# Patient Record
Sex: MALE | Race: WHITE | NOT HISPANIC OR LATINO | Employment: FULL TIME | URBAN - METROPOLITAN AREA
[De-identification: names, ages, dates, MRNs, and addresses within clinical notes are randomized per-mention and may not be internally consistent; named-entity substitution may affect disease eponyms.]

---

## 2023-02-15 PROBLEM — J45.990 EXERCISE-INDUCED ASTHMA (HHS-HCC): Status: ACTIVE | Noted: 2023-02-15

## 2023-02-15 PROBLEM — F32.A ANXIETY AND DEPRESSION: Status: ACTIVE | Noted: 2023-02-15

## 2023-02-15 PROBLEM — F17.200 TOBACCO USE DISORDER: Status: ACTIVE | Noted: 2023-02-15

## 2023-02-15 PROBLEM — F41.9 ANXIETY AND DEPRESSION: Status: ACTIVE | Noted: 2023-02-15

## 2023-02-15 PROBLEM — R00.2 HEART PALPITATIONS: Status: ACTIVE | Noted: 2023-02-15

## 2023-02-15 PROBLEM — G44.309 POST-CONCUSSION HEADACHE: Status: ACTIVE | Noted: 2023-02-15

## 2023-02-15 PROBLEM — R07.89 ATYPICAL CHEST PAIN: Status: ACTIVE | Noted: 2023-02-15

## 2023-02-15 PROBLEM — R05.3 COUGH, PERSISTENT: Status: ACTIVE | Noted: 2023-02-15

## 2023-02-15 PROBLEM — F64.9 GENDER DYSPHORIA: Status: ACTIVE | Noted: 2023-02-15

## 2023-02-15 PROBLEM — S01.91XA LACERATION OF HEAD: Status: ACTIVE | Noted: 2023-02-15

## 2023-02-15 PROBLEM — J20.9 ACUTE BRONCHITIS: Status: ACTIVE | Noted: 2023-02-15

## 2023-02-15 PROBLEM — R55 SYNCOPE AND COLLAPSE: Status: ACTIVE | Noted: 2023-02-15

## 2023-02-15 PROBLEM — Z86.16 HISTORY OF COVID-19: Status: ACTIVE | Noted: 2023-02-15

## 2023-02-15 PROBLEM — J22 LOWER RESPIRATORY INFECTION: Status: ACTIVE | Noted: 2023-02-15

## 2023-02-15 PROBLEM — R05.8 ALLERGIC COUGH: Status: ACTIVE | Noted: 2023-02-15

## 2023-02-15 PROBLEM — N93.9 ABNORMAL UTERINE BLEEDING: Status: ACTIVE | Noted: 2023-02-15

## 2023-02-15 PROBLEM — S06.0X1A CONCUSSION WTH LOSS OF CONSCIOUSNESS OF 30 MINUTES OR LESS: Status: ACTIVE | Noted: 2023-02-15

## 2023-02-15 PROBLEM — Q21.12 PATENT FORAMEN OVALE (HHS-HCC): Status: ACTIVE | Noted: 2023-02-15

## 2023-02-15 PROBLEM — Z86.59 HISTORY OF ADHD: Status: ACTIVE | Noted: 2023-02-15

## 2023-02-15 PROBLEM — F64.0 GENDER IDENTITY DISORDER OF ADOLESCENCE: Status: ACTIVE | Noted: 2023-02-15

## 2023-02-15 RX ORDER — BUSPIRONE HYDROCHLORIDE 10 MG/1
1 TABLET ORAL 3 TIMES DAILY
COMMUNITY

## 2023-02-15 RX ORDER — ESCITALOPRAM OXALATE 20 MG/1
1 TABLET ORAL DAILY
COMMUNITY
Start: 2021-12-09 | End: 2024-05-07 | Stop reason: SDUPTHER

## 2023-02-15 RX ORDER — CLOPIDOGREL BISULFATE 75 MG/1
1 TABLET ORAL DAILY
COMMUNITY
Start: 2022-09-13

## 2023-04-07 ENCOUNTER — APPOINTMENT (OUTPATIENT)
Dept: PRIMARY CARE | Facility: CLINIC | Age: 28
End: 2023-04-07
Payer: COMMERCIAL

## 2023-04-07 PROBLEM — R51.9 HEADACHE: Status: ACTIVE | Noted: 2023-04-07

## 2023-04-07 PROBLEM — E16.2 HYPOGLYCEMIA: Status: ACTIVE | Noted: 2023-04-07

## 2023-04-07 PROBLEM — K21.9 GERD (GASTROESOPHAGEAL REFLUX DISEASE): Status: ACTIVE | Noted: 2023-04-07

## 2023-04-07 RX ORDER — OMEPRAZOLE 20 MG/1
20 CAPSULE, DELAYED RELEASE ORAL DAILY
COMMUNITY

## 2023-04-07 RX ORDER — ISOPROPYL ALCOHOL 70 ML/100ML
SWAB TOPICAL
COMMUNITY

## 2023-04-07 RX ORDER — TESTOSTERONE 50 MG/5G
50 GEL TRANSDERMAL DAILY
COMMUNITY

## 2023-04-07 RX ORDER — SYRINGE W-NEEDLE,DISPOSAB,3 ML 25GX5/8"
SYRINGE, EMPTY DISPOSABLE MISCELLANEOUS
COMMUNITY

## 2023-04-07 RX ORDER — BLOOD SUGAR DIAGNOSTIC
STRIP MISCELLANEOUS DAILY
COMMUNITY

## 2023-04-07 RX ORDER — NORETHINDRONE 5 MG/1
5 TABLET ORAL DAILY
COMMUNITY

## 2023-05-17 ENCOUNTER — PATIENT OUTREACH (OUTPATIENT)
Dept: CARE COORDINATION | Facility: CLINIC | Age: 28
End: 2023-05-17
Payer: COMMERCIAL

## 2024-05-02 ENCOUNTER — TELEPHONE (OUTPATIENT)
Dept: PRIMARY CARE | Facility: CLINIC | Age: 29
End: 2024-05-02
Payer: COMMERCIAL

## 2024-05-02 NOTE — TELEPHONE ENCOUNTER
Pt called requesting refill of escitalopram. Noted pt not seen in more than 1.5 years. Attempt to schedule pt for FUV unsuccessful. No answer, voicemail left to return nurse call. Pt chart sent to scheduling pool in attempt to assist pt in scheduling to establish care.

## 2024-08-09 DIAGNOSIS — F41.9 ANXIETY AND DEPRESSION: ICD-10-CM

## 2024-08-09 DIAGNOSIS — F32.A ANXIETY AND DEPRESSION: ICD-10-CM

## 2024-08-12 RX ORDER — ESCITALOPRAM OXALATE 20 MG/1
20 TABLET ORAL DAILY
Qty: 30 TABLET | Refills: 0 | Status: SHIPPED | OUTPATIENT
Start: 2024-08-12

## 2024-08-21 ENCOUNTER — TELEPHONE (OUTPATIENT)
Dept: UROLOGY | Facility: CLINIC | Age: 29
End: 2024-08-21
Payer: COMMERCIAL

## 2024-08-21 DIAGNOSIS — F64.9 GENDER DYSPHORIA: ICD-10-CM

## 2024-08-22 ENCOUNTER — APPOINTMENT (OUTPATIENT)
Dept: UROLOGY | Facility: HOSPITAL | Age: 29
End: 2024-08-22
Payer: COMMERCIAL

## 2024-09-11 ENCOUNTER — OFFICE VISIT (OUTPATIENT)
Dept: PRIMARY CARE | Facility: CLINIC | Age: 29
End: 2024-09-11
Payer: COMMERCIAL

## 2024-09-11 VITALS
WEIGHT: 147 LBS | HEIGHT: 64 IN | OXYGEN SATURATION: 98 % | DIASTOLIC BLOOD PRESSURE: 75 MMHG | SYSTOLIC BLOOD PRESSURE: 111 MMHG | BODY MASS INDEX: 25.1 KG/M2 | HEART RATE: 62 BPM | TEMPERATURE: 97.2 F | RESPIRATION RATE: 14 BRPM

## 2024-09-11 DIAGNOSIS — R21 RASH: ICD-10-CM

## 2024-09-11 DIAGNOSIS — F64.9 GENDER DYSPHORIA: ICD-10-CM

## 2024-09-11 DIAGNOSIS — F41.9 ANXIETY AND DEPRESSION: ICD-10-CM

## 2024-09-11 DIAGNOSIS — F32.A ANXIETY AND DEPRESSION: ICD-10-CM

## 2024-09-11 DIAGNOSIS — Z86.73 HISTORY OF STROKE: ICD-10-CM

## 2024-09-11 DIAGNOSIS — R19.7 DIARRHEA, UNSPECIFIED TYPE: Primary | ICD-10-CM

## 2024-09-11 LAB
ALBUMIN SERPL BCP-MCNC: 4.7 G/DL (ref 3.4–5)
ALP SERPL-CCNC: 34 U/L (ref 33–120)
ALT SERPL W P-5'-P-CCNC: 25 U/L (ref 7–52)
ANION GAP SERPL CALC-SCNC: 13 MMOL/L (ref 10–20)
AST SERPL W P-5'-P-CCNC: 23 U/L (ref 9–39)
BASOPHILS # BLD AUTO: 0.02 X10*3/UL (ref 0–0.1)
BASOPHILS NFR BLD AUTO: 0.5 %
BILIRUB SERPL-MCNC: 0.5 MG/DL (ref 0–1.2)
BUN SERPL-MCNC: 11 MG/DL (ref 6–23)
CALCIUM SERPL-MCNC: 9.6 MG/DL (ref 8.6–10.6)
CHLORIDE SERPL-SCNC: 107 MMOL/L (ref 98–107)
CHOLEST SERPL-MCNC: 193 MG/DL (ref 0–199)
CHOLESTEROL/HDL RATIO: 2.9
CO2 SERPL-SCNC: 25 MMOL/L (ref 21–32)
CREAT SERPL-MCNC: 0.91 MG/DL (ref 0.5–1.3)
EGFRCR SERPLBLD CKD-EPI 2021: 89 ML/MIN/1.73M*2
EOSINOPHIL # BLD AUTO: 0.06 X10*3/UL (ref 0–0.7)
EOSINOPHIL NFR BLD AUTO: 1.5 %
ERYTHROCYTE [DISTWIDTH] IN BLOOD BY AUTOMATED COUNT: 11.8 % (ref 11.5–14.5)
GLIADIN PEPTIDE IGA SER IA-ACNC: <1 U/ML
GLUCOSE SERPL-MCNC: 85 MG/DL (ref 74–99)
HCT VFR BLD AUTO: 40.1 % (ref 36–52)
HDLC SERPL-MCNC: 67 MG/DL
HGB BLD-MCNC: 13.5 G/DL (ref 12–17.5)
IMM GRANULOCYTES # BLD AUTO: 0.02 X10*3/UL (ref 0–0.7)
IMM GRANULOCYTES NFR BLD AUTO: 0.5 % (ref 0–0.9)
LDLC SERPL CALC-MCNC: 114 MG/DL
LYMPHOCYTES # BLD AUTO: 2.09 X10*3/UL (ref 1.2–4.8)
LYMPHOCYTES NFR BLD AUTO: 51.4 %
MCH RBC QN AUTO: 29.9 PG (ref 26–34)
MCHC RBC AUTO-ENTMCNC: 33.7 G/DL (ref 32–36)
MCV RBC AUTO: 89 FL (ref 80–100)
MONOCYTES # BLD AUTO: 0.3 X10*3/UL (ref 0.1–1)
MONOCYTES NFR BLD AUTO: 7.4 %
NEUTROPHILS # BLD AUTO: 1.58 X10*3/UL (ref 1.2–7.7)
NEUTROPHILS NFR BLD AUTO: 38.7 %
NON HDL CHOLESTEROL: 126 MG/DL (ref 0–149)
NRBC BLD-RTO: 0 /100 WBCS (ref 0–0)
PLATELET # BLD AUTO: 290 X10*3/UL (ref 150–450)
POTASSIUM SERPL-SCNC: 4.2 MMOL/L (ref 3.5–5.3)
PROT SERPL-MCNC: 7.1 G/DL (ref 6.4–8.2)
RBC # BLD AUTO: 4.51 X10*6/UL (ref 4–5.9)
SODIUM SERPL-SCNC: 141 MMOL/L (ref 136–145)
TESTOST SERPL-MCNC: <30 NG/DL (ref 0–1000)
TRIGL SERPL-MCNC: 60 MG/DL (ref 0–149)
TTG IGA SER IA-ACNC: <1 U/ML
VLDL: 12 MG/DL (ref 0–40)
WBC # BLD AUTO: 4.1 X10*3/UL (ref 4.4–11.3)

## 2024-09-11 PROCEDURE — 3008F BODY MASS INDEX DOCD: CPT | Performed by: FAMILY MEDICINE

## 2024-09-11 PROCEDURE — 85025 COMPLETE CBC W/AUTO DIFF WBC: CPT | Performed by: FAMILY MEDICINE

## 2024-09-11 PROCEDURE — 80053 COMPREHEN METABOLIC PANEL: CPT | Performed by: FAMILY MEDICINE

## 2024-09-11 PROCEDURE — 80061 LIPID PANEL: CPT | Performed by: FAMILY MEDICINE

## 2024-09-11 PROCEDURE — 99214 OFFICE O/P EST MOD 30 MIN: CPT | Performed by: FAMILY MEDICINE

## 2024-09-11 PROCEDURE — 83516 IMMUNOASSAY NONANTIBODY: CPT | Performed by: FAMILY MEDICINE

## 2024-09-11 PROCEDURE — 84403 ASSAY OF TOTAL TESTOSTERONE: CPT | Performed by: FAMILY MEDICINE

## 2024-09-11 PROCEDURE — 1036F TOBACCO NON-USER: CPT | Performed by: FAMILY MEDICINE

## 2024-09-11 PROCEDURE — 36415 COLL VENOUS BLD VENIPUNCTURE: CPT | Performed by: FAMILY MEDICINE

## 2024-09-11 RX ORDER — ASPIRIN 81 MG/1
81 TABLET ORAL DAILY
Qty: 30 TABLET | Refills: 5 | Status: SHIPPED | OUTPATIENT
Start: 2024-09-11 | End: 2025-09-11

## 2024-09-11 RX ORDER — TRIAMCINOLONE ACETONIDE 1 MG/G
OINTMENT TOPICAL 2 TIMES DAILY PRN
Qty: 60 G | Refills: 2 | Status: SHIPPED | OUTPATIENT
Start: 2024-09-11 | End: 2025-01-09

## 2024-09-11 RX ORDER — ESCITALOPRAM OXALATE 20 MG/1
20 TABLET ORAL DAILY
Qty: 30 TABLET | Refills: 5 | Status: SHIPPED | OUTPATIENT
Start: 2024-09-11

## 2024-09-11 ASSESSMENT — ENCOUNTER SYMPTOMS
HEADACHES: 0
APPETITE CHANGE: 1
BRUISES/BLEEDS EASILY: 0
PALPITATIONS: 0
ARTHRALGIAS: 1
LIGHT-HEADEDNESS: 0
SHORTNESS OF BREATH: 0
JOINT SWELLING: 0
COUGH: 0
SORE THROAT: 0
FEVER: 0
DIZZINESS: 0
CHILLS: 0

## 2024-09-11 ASSESSMENT — PAIN SCALES - GENERAL: PAINLEVEL: 0-NO PAIN

## 2024-09-11 NOTE — PROGRESS NOTES
"  Subjective   Patient ID: Roman Way is a 28 y.o. transgender male who presents for Annual Exam.    HPI    He reports a 6 month history of gas and bloating after eating. He also reports 2 episodes per day of loose, watery, green diarrhea that smells very bad. His symptoms occur after every meal regardless of what he ate. His wife does not have any GI symptoms, but his step son has diarrhea as well although he describes it as \"different\". He has acid reflux when he drinks pop so he avoids it.  He has never had anything like this in the past. He has a family history of lactose intolerance, celiac disease, IBS, and Crohn's.  Of note, he did eat primarily gluten free a few years ago, but has been eating gluten since then. He stopped vaping 1 year ago and stopped using nicotine pouches 6 months ago.    He did have a stroke approximately 2 years ago and subsequently had his PFO surgically repaired. He notes that he is still forgetful while he is talking, but denies any other symptoms including headaches.    He is currently working with urology to scheduled a hysterectomy. He was previously taking testosterone but stopped after his stroke. He is working on getting 2 letters in support of his gender affirming surgery.    His depression is very well controlled with Lexapro, but he describes his anxiety as \"horrible\" and constant. His anxiety has been much worse since he stopped vaping. Prozac and Buspar did not work well for him. He denies thoughts of harming himself. He has an appointment with a psychologist tomorrow for a letter for his gender affirming surgery.    He reports \"sweat pimples\" on his legs that are itchy. He has had similar rashes throughout his life but thinks it is getting worse. The rash will go away when he is not working.    PMH  Stroke: 2 years ago  PFO: surgically closed 2 years ago  Anxiety  Depression    Medications  Lexapro: currently taking 10 mg daily  Not currently taking aspirin " "since he has been forgetting to take it    Surgical History  PFO correction: November 2022    Social history  Recently got  (wife's name is Darby). They are trying to have a baby. They are using a sperm donor and are waiting to see if Darby is pregnant.  He is no longer interested in saving his eggs as it is too expensive.  He has a 4 year old step son. He works at [employer]. His wife works at a .    Family history  Celiac disease: majority of dad's side of family  Crohn's disease: Cousin on dad's side  IBS: Mother and brother  No know history of colon cancer      Review of Systems   Constitutional:  Positive for appetite change. Negative for chills and fever.   HENT:  Negative for congestion and sore throat.    Respiratory:  Negative for cough and shortness of breath.    Cardiovascular:  Negative for chest pain and palpitations.   Genitourinary: Negative.    Musculoskeletal:  Positive for arthralgias. Negative for joint swelling.   Skin:  Positive for rash.   Neurological:  Negative for dizziness, light-headedness and headaches.   Hematological:  Does not bruise/bleed easily.       Objective     /75   Pulse 62   Temp 36.2 °C (97.2 °F)   Resp 14   Ht 1.626 m (5' 4\")   Wt 66.7 kg (147 lb)   SpO2 98%   BMI 25.23 kg/m²   General: well appearing, no distress  Neck: No lymphadenopathy, no thyromegaly  CV: Regular rate and rhythm, no murmur  Lungs: Clear to auscultation bilaterally  Abdomen: Soft, nondistended, no rebound or guarding, slight epigastric tenderness,   Extremities: No edema noted, bilateral diffuse maculopapular rash throughout entire lower extremities, L>R  Psych: Appropriate mood and affect       Assessment/Plan     Roman Way is a 28 y.o. transgender male who presents for diarrhea, rash, anxiety, and gender affirming care.    #Anxiety and Depression  -Currently taking Lexapro 10mg, will increase to 20mg today. If symptoms worsen, can go back to 10mg.  -Placed referral " to Jake [facility]ne and provided a list of other therapists in the area    #Diarrhea  -Differential includes lactose intolerance, celiac disease, and IBS. Less likely Crohn's or fat malabsorption.  -Will draw celiac panel, CBC, CMP, and lipid panel today  -Plan for elimination diet: will start using lactaid for at least 2 weeks. If symptoms do not improve, will cut out gluten for at least 2 weeks.  -Plan to follow-up in 1 month and can consider fecal calprotectin and/or gallbladder ultrasound if indicated    #Gender affirming care  -Follow-up with Dr. Oconnor concerning hysterectomy  - Patient would like to resume testosterone- previously took IM testosterone cypionate. Was taking 75 mg weekly, although levels very supratherapeutic. Decreased to 50 mg prior to stopping. We previously discussed resuming with his neurologist and he was approved to resume from a stroke standpoint, however he was then looking into the process of fertility preservation. He no longer wants to pursue fertility preservation.   - We discussed effects and risks of testosterone, of which he was already aware from prior therapy. Pending labs will likely resume 50 mg weekly    #History of stroke  -Plan to restart baby aspirin    #Rash  -Prescribed Kenalog for use on rash as needed  -Plan to follow-up in 1 month to assess resolution of rash with cream  -If needed, can refer to dermatology    Will address health maintenance at next visit       Plan to follow-up in one month to restart testosterone, and follow-up on GI symptoms and rash.    Shama Sebastian, MS-3     Patient was seen and examined by myself in conjunction with medical student. I have edited note above. Dante Lawson

## 2024-09-12 ENCOUNTER — TELEMEDICINE (OUTPATIENT)
Dept: UROLOGY | Facility: HOSPITAL | Age: 29
End: 2024-09-12
Payer: COMMERCIAL

## 2024-09-12 DIAGNOSIS — F64.9 GENDER DYSPHORIA: Primary | ICD-10-CM

## 2024-09-12 PROCEDURE — 90791 PSYCH DIAGNOSTIC EVALUATION: CPT | Performed by: PSYCHOLOGIST

## 2024-09-14 LAB
GLIADIN PEPTIDE IGG SER IA-ACNC: <0.56 FLU (ref 0–4.99)
TTG IGG SER IA-ACNC: <0.82 FLU (ref 0–4.99)

## 2024-09-17 DIAGNOSIS — F64.9 GENDER DYSPHORIA: Primary | ICD-10-CM

## 2024-09-17 RX ORDER — SYRINGE W-NEEDLE,DISPOSAB,3 ML 25GX5/8"
SYRINGE, EMPTY DISPOSABLE MISCELLANEOUS
Qty: 12 EACH | Refills: 3 | Status: SHIPPED | OUTPATIENT
Start: 2024-09-17

## 2024-09-17 RX ORDER — SYRINGE WITH NEEDLE, 1 ML 25GX5/8"
SYRINGE, EMPTY DISPOSABLE MISCELLANEOUS
Qty: 12 EACH | Refills: 3 | Status: SHIPPED | OUTPATIENT
Start: 2024-09-17

## 2024-09-17 RX ORDER — ISOPROPYL ALCOHOL 70 ML/100ML
1 SWAB TOPICAL
Qty: 100 EACH | Refills: 3 | Status: SHIPPED | OUTPATIENT
Start: 2024-09-22

## 2024-09-17 RX ORDER — TESTOSTERONE CYPIONATE 200 MG/ML
50 INJECTION, SOLUTION INTRAMUSCULAR
Qty: 4 ML | Refills: 1 | Status: SHIPPED | OUTPATIENT
Start: 2024-09-17

## 2024-09-19 NOTE — PROGRESS NOTES
Outpatient Psychology Initial Appointment  Subjective   Roman Way, a 28 y.o. adult, for initial evaluation visit. Participated in diagnostic interview and identification of goals for treatment.      Patient is referred by  [facility].      Reason:  He has been experiencing ongoing gender dysphoria.      Psychosocial History Roman is seeking psychological services in an effort to gain a letter of support for a hysterectomy.  Roman reports that he has undergone gender affirmative care for the past 10 years.  He grew up a middle child with two brothers.  According to Roman, he always wanted to do what they did and felt drawn to more masculine things.  He initially came out as a Lesbian for a few years, but determined by high school that sexual identity did not completely capture what he was feeling.  He then started to read on gender and gender identity to help understand himself.  Out of high school, Roman joined the , but was discharged due to mental health and gender dysphoria.  He states that parents initially had some difficulty accepting this about him.  He began gender affirming hormone therapy on April 20, 2017 with testosterone.  In March of 2018 he also had top surgery.  Roman states that while these were all helpful steps, he still has some dysphoria with bottom, and would like the benefit of no longer having any menstruation. While he is undecided currently, he also states that in the event he would want to do bottom surgery/phalloplasty this step would already be complete.  Roman states that he has been off of testosterone for approximately a year due to having had a stroke as a result of a hole in his heart.  This stroke was reportedly in his temporal lobe at at times he forgets what he is saying.      Roman grew up in the [place], [place] area.  He currently works as an [employer] , and has for the past year.  Prior to this he was with [employer] for 3-4 years doing the  same thing.  He stated that he left there after having an incident where he passed out on the pole.  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  They have reportedly been together for 2 years, and  for 2 months. She is reportedly a big support.      Mental Status Evaluation:  Appearance:  age appropriate   Behavior:  normal   Speech:  normal pitch   Mood:  normal   Affect:  normal   Thought Process:  normal   Thought Content:  normal   Sensorium:  person, place, time/date, situation, day of week, month of year, and year   Cognition:  grossly intact   Insight:  Intact, as evidenced by ability to express internal thoughts, feelings, insights.          Assessment/Plan     Diagnosis:   Patient Active Problem List   Diagnosis    Abnormal uterine bleeding    Acute bronchitis    Allergic cough    Atypical chest pain    Concussion wth loss of consciousness of 30 minutes or less    Anxiety and depression    Cough, persistent    Exercise-induced asthma (HHS-HCC)    Gender identity disorder of adolescence    Gender dysphoria    History of ADHD    History of COVID-19    Laceration of head    Lower respiratory infection    Patent foramen ovale (HHS-HCC)    Post-concussion headache    Syncope and collapse    Tobacco use disorder    Heart palpitations    GERD (gastroesophageal reflux disease)    Headache    Hypoglycemia       Treatment Goals:  Specify outcomes written in observable, behavioral terms:   Alleviate Gender Dysphoria    Treatment Plan/Recommendations: Letter of support for transgender affirmative care to follow.      Provider is available for ongoing care if needed.       Review with patient: Treatment plan reviewed with the patient.    Andrew Michael PsyD

## 2024-10-02 NOTE — PROGRESS NOTES
FOLLOW-UP VISIT       HISTORY OF PRESENT ILLNESS:   Roman Way is a 28 y.o. adult who presents to me today for follow-up to further discuss scheduling for a hysterectomy. The patient previously had a CVA and has completed treatment with Plavix. He is no longer pursuing oocyte preservation as he is not interested in having children.     He has resumed testosterone.    PAST MEDICAL HISTORY:  Past Medical History:   Diagnosis Date    Cerebral infarction, unspecified (Multi) 08/25/2022    Cryptogenic stroke    Exercise induced bronchospasm (HHS-HCC) 05/20/2020    Mild exercise-induced asthma    Personal history of other mental and behavioral disorders 05/20/2020    History of attention deficit hyperactivity disorder (ADHD)       PAST SURGICAL HISTORY:  Past Surgical History:   Procedure Laterality Date    MR HEAD ANGIO WO IV CONTRAST  1/27/2023    MR HEAD ANGIO WO IV CONTRAST 1/27/2023 [facility]GAC[facility]    MR HEAD ANGIO WO IV CONTRAST  8/14/2022    MR HEAD ANGIO WO IV CONTRAST 8/14/2022 [facility] CLINICAL LEGAC[facility]    MR HEAD ANGIO WO IV CONTRAST  5/15/2023    MR HEAD ANGIO WO IV CONTRAST 5/15/2023 [facility]    MR NECK ANGIO WO IV CONTRAST  1/27/2023    MR NECK ANGIO WO IV CONTRAST 1/27/2023 [facility] OFF[facility] LEGAC[facility]    MR NECK ANGIO WO IV CONTRAST  8/14/2022    MR NECK ANGIO WO IV CONTRAST 8/14/2022 [facility] CLINICAL LEGACY    MR NECK ANGIO WO IV CONTRAST  5/15/2023    MR NECK ANGIO WO IV CONTRAST 5/15/2023 [facility]    OTHER SURGICAL HISTORY  02/21/2019    Mastectomy bilateral    OTHER SURGICAL HISTORY  05/20/2020    Bosler tooth extraction        ALLERGIES:   No Known Allergies     MEDICATIONS:   Medication Documentation Review Audit       Reviewed by Rhianna Simental CMA (Medical Assistant) on 09/11/24 at 1043      Medication Order Taking? Sig Documenting Provider Last Dose Status   alcohol swabs (Alcohol Prep Pads) pads, medicated [id number] No Apply topically. Historical Provider, MD Not Taking Active   aspirin-calcium  "carbonate 81 mg-300 mg calcium(777 mg) tablet [id number] No Take 1 tablet by mouth in the morning. Keron Walter MD Not Taking Active   busPIRone (Buspar) 10 mg tablet [id number] No Take 1 tablet (10 mg) by mouth 3 times a day. Keron Walter MD Not Taking Active   clopidogrel (Plavix) 75 mg tablet [id number] No Take 1 tablet (75 mg) by mouth once daily. Keron Walter MD Not Taking Active   escitalopram (Lexapro) 20 mg tablet [id number] Yes Take 1 tablet (20 mg) by mouth once daily. Norma Lawson MD Taking Active   FREESTYLE LANCETS MISC [id number] No  Keron Walter MD Not Taking Active   FreeStyle Test strip [id number] No once daily. Keron Walter MD Not Taking Flag for Review   norethindrone (Aygestin) 5 mg tablet [id number] No Take 1 tablet (5 mg) by mouth once daily. Keron Walter MD Not Taking Active   omeprazole (PriLOSEC) 20 mg DR capsule [id number] No Take 1 capsule (20 mg) by mouth once daily. Do not crush or chew. Keron Walter MD Not Taking Active   syringe with needle 3 mL 22 x 1 1/2\" syringe [id number] No Inject testosterone once weekly as instructed by MD Keron Walter MD Not Taking Active   testosterone (Androgel) 50 mg/5 gram (1 %) gel [id number] No Place 1 packet (50 mg) on the skin once daily. To calf Keron Walter MD Not Taking Active                     SOCIAL HISTORY:  Patient  reports that he has never smoked. He has never used smokeless tobacco. He reports that he does not drink alcohol and does not use drugs.   Social History     Socioeconomic History    Marital status: Single     Spouse name: Not on file    Number of children: Not on file    Years of education: Not on file    Highest education level: Not on file   Occupational History    Not on file   Tobacco Use    Smoking status: Never    Smokeless tobacco: Never   Substance and Sexual Activity    Alcohol use: Never    Drug use: Never    Sexual activity: Not on file   Other Topics Concern    " Not on file   Social History Narrative    Not on file     Social Determinants of Health     Financial Resource Strain: Not on file   Food Insecurity: Not on file   Transportation Needs: Not on file   Physical Activity: Not on file   Stress: Not on file   Social Connections: Not on file   Intimate Partner Violence: Not on file   Housing Stability: Not on file       FAMILY HISTORY:  Family History   Problem Relation Name Age of Onset    Fibromyalgia Mother      Depression Mother      Thyroid disease Mother      Thyroid cancer Mother      Hypertension Father      Alcohol abuse Father         REVIEW OF SYSTEMS:  Constitutional: Negative for fever and chills. Denies anorexia, weight loss.  Eyes: Negative for visual disturbance.   Respiratory: Negative for shortness of breath.    Cardiovascular: Negative for chest pain.   Gastrointestinal: Negative for nausea and vomiting.   Genitourinary: See interval history above.  Skin: Negative for rash.   Neurological: Negative for dizziness and numbness.   Psychiatric/Behavioral: Negative for confusion and decreased concentration.     PHYSICAL EXAM:  There were no vitals taken for this visit.  Virtual appointment, patient appears well.      Assessment:      1. History of stroke in adulthood  Referral to Vascular Medicine          Roman Way is a 28 y.o. adult presenting to discuss gender affirming hysterectomy.     We discussed lack of research guidance for comparison of long-term outcomes of ovarian conservation with testosterone treatment compared to ovarian removal.   The patient would prefer: laparoscopic total hysterectomy, BSO, and cystoscopy    We discussed the low risk of vaginal cuff cancer with cervix removal, but still a risk, and while we would not be able to access the vaginal cuff after a vaginectomy for the purpose of cytology/pap smear, he could consider completing vaccination for HPV.      We discussed high likelihood of laparoscopic procedure, but  possible vaginal hysterectomy in the case of adequate descensus.     Due to the patient's previous history of stroke and PFO, we will have him get surgical clearance from a member of the vascular medicine team. I will put in a referral to Dr. Amber Huggins at .      Plan:   Order pelvic ultrasound.   Referral to Dr. Amber Huggins in vascular medicine for pre-surgical clearance.  Patient will be scheduled for a laparoscopic total hysterectomy, bilateral salpingo-oophorectomy, and cystoscopy at their earliest convenience.       Disha Oconnor MD MPH      Scribe Attestation  By signing my name below, I, Dariel Crespo   attest that this documentation has been prepared under the direction and in the presence of Disha Oconnor MD MPH.

## 2024-10-03 ENCOUNTER — APPOINTMENT (OUTPATIENT)
Dept: UROLOGY | Facility: CLINIC | Age: 29
End: 2024-10-03
Payer: COMMERCIAL

## 2024-10-04 ENCOUNTER — OFFICE VISIT (OUTPATIENT)
Dept: PRIMARY CARE | Facility: CLINIC | Age: 29
End: 2024-10-04
Payer: COMMERCIAL

## 2024-10-04 ENCOUNTER — TELEMEDICINE (OUTPATIENT)
Dept: UROLOGY | Facility: CLINIC | Age: 29
End: 2024-10-04
Payer: COMMERCIAL

## 2024-10-04 VITALS
HEART RATE: 69 BPM | RESPIRATION RATE: 14 BRPM | OXYGEN SATURATION: 98 % | DIASTOLIC BLOOD PRESSURE: 76 MMHG | SYSTOLIC BLOOD PRESSURE: 120 MMHG | HEIGHT: 64 IN | WEIGHT: 151 LBS | BODY MASS INDEX: 25.78 KG/M2

## 2024-10-04 DIAGNOSIS — R19.7 DIARRHEA, UNSPECIFIED TYPE: ICD-10-CM

## 2024-10-04 DIAGNOSIS — Z86.73 HISTORY OF STROKE IN ADULTHOOD: ICD-10-CM

## 2024-10-04 DIAGNOSIS — F32.A ANXIETY AND DEPRESSION: ICD-10-CM

## 2024-10-04 DIAGNOSIS — F41.9 ANXIETY AND DEPRESSION: ICD-10-CM

## 2024-10-04 DIAGNOSIS — Z86.73 HISTORY OF STROKE: ICD-10-CM

## 2024-10-04 DIAGNOSIS — F64.9 GENDER DYSPHORIA: Primary | ICD-10-CM

## 2024-10-04 PROCEDURE — 1036F TOBACCO NON-USER: CPT | Performed by: OBSTETRICS & GYNECOLOGY

## 2024-10-04 PROCEDURE — 99214 OFFICE O/P EST MOD 30 MIN: CPT | Performed by: FAMILY MEDICINE

## 2024-10-04 PROCEDURE — 3008F BODY MASS INDEX DOCD: CPT | Performed by: FAMILY MEDICINE

## 2024-10-04 PROCEDURE — 99213 OFFICE O/P EST LOW 20 MIN: CPT | Performed by: OBSTETRICS & GYNECOLOGY

## 2024-10-04 PROCEDURE — 1036F TOBACCO NON-USER: CPT | Performed by: FAMILY MEDICINE

## 2024-10-04 ASSESSMENT — PAIN SCALES - GENERAL: PAINLEVEL: 0-NO PAIN

## 2024-10-04 NOTE — PROGRESS NOTES
"  Subjective   Patient ID: Roman Way is a 28 y.o. transgender male who presents for Follow-up.    HPI    GI upset  - See last visit for further information. At this time he was reporting gas, bloating and diarrhea. Our plan was to get labs (all reassuring) and trial elimination diet  - He is doing much better now that he has stopped dairy    History of stroke  - Has resumed aspirin since last visit    Gender affirming care  - Resumed testosterone 3 weeks ago, tolerating well  - Working with Dr. Oconnor on scheduling hysterectomy. Has a letter of support     Anxiety  - Last visit we increased lexapro  - Anxiety slightly improved   - Additionally he has upcoming appointment with therapist at [facility]  - Complains of lack of sex drive, which he is working on in couples therapy. This has been present for 2 years.     10 point review of systems negative except as noted in HPI.      Objective     /76   Pulse 69   Resp 14   Ht 1.626 m (5' 4\")   Wt 68.5 kg (151 lb)   SpO2 98%   BMI 25.92 kg/m²   General: well appearing, no distress  Neck: No lymphadenopathy, no thyromegaly  CV: Regular rate and rhythm, no murmur  Lungs: Clear to auscultation bilaterally  Abdomen: Soft, nontender, nondistended  Extremities: No edema noted  Psych: Appropriate mood and affect        Assessment/Plan     Roman Way is a 28 y.o. transgender male who presents for follow-up    #Anxiety and Depression  -Continue lexapro 20 mg  - Encouraged counseling    #Decreased sex drive  - May in part be related to SSRI, and/or poorly controlled anxiety  - Hesitant to begin wellbutrin at this point but may be worth considering down the line  - Will check testosterone levels as well, as this may assist in sex drive     #GI upset  -Improving, monitor    #Gender affirming care  -Follow-up with Dr. Oconnor concerning hysterectomy  - Continue testosterone, with labs in a month    #History of stroke  -Continue " aspirin     RTC 1 month via virtual visit, labs prior

## 2024-10-07 ENCOUNTER — HOSPITAL ENCOUNTER (OUTPATIENT)
Facility: HOSPITAL | Age: 29
Setting detail: OUTPATIENT SURGERY
End: 2024-10-07
Attending: OBSTETRICS & GYNECOLOGY | Admitting: OBSTETRICS & GYNECOLOGY
Payer: COMMERCIAL

## 2024-10-07 ENCOUNTER — PREP FOR PROCEDURE (OUTPATIENT)
Dept: UROLOGY | Facility: CLINIC | Age: 29
End: 2024-10-07
Payer: COMMERCIAL

## 2024-10-07 DIAGNOSIS — Z01.818 PRE-OP TESTING: ICD-10-CM

## 2024-10-07 DIAGNOSIS — F64.9 GENDER DYSPHORIA: ICD-10-CM

## 2024-10-21 ENCOUNTER — APPOINTMENT (OUTPATIENT)
Dept: CARDIOLOGY | Facility: CLINIC | Age: 29
End: 2024-10-21
Payer: COMMERCIAL

## 2024-10-21 ENCOUNTER — APPOINTMENT (OUTPATIENT)
Dept: WOUND CARE | Facility: CLINIC | Age: 29
End: 2024-10-21
Payer: COMMERCIAL

## 2024-10-23 ENCOUNTER — APPOINTMENT (OUTPATIENT)
Dept: CARDIOLOGY | Facility: CLINIC | Age: 29
End: 2024-10-23
Payer: COMMERCIAL

## 2024-10-24 ENCOUNTER — PREP FOR PROCEDURE (OUTPATIENT)
Dept: UROLOGY | Facility: CLINIC | Age: 29
End: 2024-10-24
Payer: COMMERCIAL

## 2024-10-31 ENCOUNTER — OFFICE VISIT (OUTPATIENT)
Dept: URGENT CARE | Age: 29
End: 2024-10-31
Payer: COMMERCIAL

## 2024-10-31 VITALS
OXYGEN SATURATION: 99 % | SYSTOLIC BLOOD PRESSURE: 121 MMHG | DIASTOLIC BLOOD PRESSURE: 72 MMHG | WEIGHT: 150 LBS | BODY MASS INDEX: 25.75 KG/M2 | TEMPERATURE: 97.1 F | RESPIRATION RATE: 16 BRPM | HEART RATE: 55 BPM

## 2024-10-31 DIAGNOSIS — N89.8 VAGINAL DISCHARGE: Primary | ICD-10-CM

## 2024-10-31 DIAGNOSIS — H92.02 OTALGIA OF LEFT EAR: ICD-10-CM

## 2024-10-31 DIAGNOSIS — J02.9 SORE THROAT: ICD-10-CM

## 2024-10-31 LAB
POC APPEARANCE, URINE: CLEAR
POC BILIRUBIN, URINE: NEGATIVE
POC BLOOD, URINE: ABNORMAL
POC COLOR, URINE: YELLOW
POC GLUCOSE, URINE: NEGATIVE MG/DL
POC KETONES, URINE: NEGATIVE MG/DL
POC LEUKOCYTES, URINE: ABNORMAL
POC NITRITE,URINE: NEGATIVE
POC PH, URINE: 6 PH
POC PROTEIN, URINE: NEGATIVE MG/DL
POC RAPID STREP: NEGATIVE
POC SPECIFIC GRAVITY, URINE: 1.01
POC UROBILINOGEN, URINE: 0.2 EU/DL

## 2024-10-31 PROCEDURE — 87205 SMEAR GRAM STAIN: CPT

## 2024-10-31 PROCEDURE — 87661 TRICHOMONAS VAGINALIS AMPLIF: CPT

## 2024-10-31 PROCEDURE — 87086 URINE CULTURE/COLONY COUNT: CPT

## 2024-10-31 PROCEDURE — 87491 CHLMYD TRACH DNA AMP PROBE: CPT

## 2024-10-31 PROCEDURE — 87591 N.GONORRHOEAE DNA AMP PROB: CPT

## 2024-10-31 ASSESSMENT — ENCOUNTER SYMPTOMS
CARDIOVASCULAR NEGATIVE: 1
CONSTITUTIONAL NEGATIVE: 1
FEVER: 0
FLANK PAIN: 0
RESPIRATORY NEGATIVE: 1
CHILLS: 0
EYES NEGATIVE: 1
FREQUENCY: 0
VOMITING: 0
MUSCULOSKELETAL NEGATIVE: 1
DIFFICULTY URINATING: 0
NAUSEA: 0
NEUROLOGICAL NEGATIVE: 1
DYSURIA: 0
FATIGUE: 0
RHINORRHEA: 1
SORE THROAT: 1
ABDOMINAL PAIN: 0
HEMATURIA: 0

## 2024-11-01 LAB
C TRACH RRNA SPEC QL NAA+PROBE: NEGATIVE
N GONORRHOEA DNA SPEC QL PROBE+SIG AMP: NEGATIVE
T VAGINALIS RRNA SPEC QL NAA+PROBE: NEGATIVE

## 2024-11-02 LAB — BACTERIA UR CULT: NORMAL

## 2024-11-03 LAB
CLUE CELLS VAG LPF-#/AREA: NORMAL /[LPF]
NUGENT SCORE: 2
YEAST VAG WET PREP-#/AREA: NORMAL

## 2024-11-04 ENCOUNTER — APPOINTMENT (OUTPATIENT)
Dept: PRIMARY CARE | Facility: CLINIC | Age: 29
End: 2024-11-04
Payer: COMMERCIAL

## 2024-11-12 ENCOUNTER — HOSPITAL ENCOUNTER (OUTPATIENT)
Facility: HOSPITAL | Age: 29
Setting detail: OUTPATIENT SURGERY
End: 2024-11-12
Attending: OBSTETRICS & GYNECOLOGY | Admitting: OBSTETRICS & GYNECOLOGY
Payer: COMMERCIAL

## 2024-11-12 ENCOUNTER — PREP FOR PROCEDURE (OUTPATIENT)
Dept: UROLOGY | Facility: CLINIC | Age: 29
End: 2024-11-12
Payer: COMMERCIAL

## 2024-11-12 DIAGNOSIS — Z01.818 PRE-OP TESTING: ICD-10-CM

## 2024-11-12 DIAGNOSIS — F64.9 GENDER DYSPHORIA: ICD-10-CM

## 2024-12-02 DIAGNOSIS — F64.9 GENDER DYSPHORIA: ICD-10-CM

## 2024-12-02 RX ORDER — SYRINGE W-NEEDLE,DISPOSAB,3 ML 25GX5/8"
SYRINGE, EMPTY DISPOSABLE MISCELLANEOUS
Qty: 12 EACH | Refills: 3 | Status: SHIPPED | OUTPATIENT
Start: 2024-12-02

## 2024-12-02 RX ORDER — ISOPROPYL ALCOHOL 70 ML/100ML
1 SWAB TOPICAL
Qty: 100 EACH | Refills: 3 | Status: SHIPPED | OUTPATIENT
Start: 2024-12-02

## 2024-12-02 RX ORDER — SYRINGE WITH NEEDLE, 1 ML 25GX5/8"
SYRINGE, EMPTY DISPOSABLE MISCELLANEOUS
Qty: 12 EACH | Refills: 3 | Status: SHIPPED | OUTPATIENT
Start: 2024-12-02

## 2024-12-06 ENCOUNTER — APPOINTMENT (OUTPATIENT)
Dept: CARDIOLOGY | Facility: HOSPITAL | Age: 29
End: 2024-12-06
Payer: COMMERCIAL

## 2024-12-19 ENCOUNTER — TELEPHONE (OUTPATIENT)
Dept: UROLOGY | Facility: CLINIC | Age: 29
End: 2024-12-19
Payer: COMMERCIAL

## 2024-12-19 NOTE — TELEPHONE ENCOUNTER
"Pre-Consult Phone Call    Verified patient by name and date of birth.    Confirmed consult needed for urological reconstruction.    Medicaid/Medicare? no    Sterilization Complete? no (Pt of Oconnor, hyster will be done before phallo)    Patient interested in RFF Phalloplasty     Information provided TGD questionnaire, Letters of support, and Surgical procedure brochure     HRT >1 year 2017     Nicotine Current smoker    DM2 Never     Height 5' 4\"    Weight approx 150 lbs    BMI approx 25.8    Hair Removal None    Ensured patient is aware that letters of support are valid for one year and that we cannot schedule their surgery until we have received copies of both letters.    Encouraged patient to become familiar with their insurance policy, coverage and financial responsibility.    Addressed patient's questions.    Encouraged patient to contact the team with any other questions or concerns.  "

## 2024-12-24 ENCOUNTER — LAB (OUTPATIENT)
Dept: LAB | Facility: LAB | Age: 29
End: 2024-12-24
Payer: COMMERCIAL

## 2024-12-24 DIAGNOSIS — R63.5 WEIGHT GAIN: ICD-10-CM

## 2024-12-24 DIAGNOSIS — F64.9 GENDER DYSPHORIA: ICD-10-CM

## 2024-12-24 LAB
ALBUMIN SERPL BCP-MCNC: 4.6 G/DL (ref 3.4–5)
ALP SERPL-CCNC: 32 U/L (ref 33–120)
ALT SERPL W P-5'-P-CCNC: 10 U/L (ref 7–52)
ANION GAP SERPL CALC-SCNC: 10 MMOL/L (ref 10–20)
AST SERPL W P-5'-P-CCNC: 15 U/L (ref 9–39)
BILIRUB SERPL-MCNC: 0.5 MG/DL (ref 0–1.2)
BUN SERPL-MCNC: 12 MG/DL (ref 6–23)
CALCIUM SERPL-MCNC: 9.3 MG/DL (ref 8.6–10.3)
CHLORIDE SERPL-SCNC: 108 MMOL/L (ref 98–107)
CO2 SERPL-SCNC: 26 MMOL/L (ref 21–32)
CREAT SERPL-MCNC: 0.92 MG/DL (ref 0.5–1.3)
EGFRCR SERPLBLD CKD-EPI 2021: 88 ML/MIN/1.73M*2
ERYTHROCYTE [DISTWIDTH] IN BLOOD BY AUTOMATED COUNT: 12.2 % (ref 11.5–14.5)
GLUCOSE SERPL-MCNC: 88 MG/DL (ref 74–99)
HCT VFR BLD AUTO: 43.5 % (ref 36–52)
HGB BLD-MCNC: 14.8 G/DL (ref 12–17.5)
MCH RBC QN AUTO: 30.1 PG (ref 26–34)
MCHC RBC AUTO-ENTMCNC: 34 G/DL (ref 32–36)
MCV RBC AUTO: 89 FL (ref 80–100)
NRBC BLD-RTO: 0 /100 WBCS (ref 0–0)
PLATELET # BLD AUTO: 268 X10*3/UL (ref 150–450)
POTASSIUM SERPL-SCNC: 4.1 MMOL/L (ref 3.5–5.3)
PROT SERPL-MCNC: 6.7 G/DL (ref 6.4–8.2)
RBC # BLD AUTO: 4.91 X10*6/UL (ref 4–5.9)
SODIUM SERPL-SCNC: 140 MMOL/L (ref 136–145)
TESTOST SERPL-MCNC: 765 NG/DL (ref 0–1000)
TSH SERPL-ACNC: 1.59 MIU/L (ref 0.44–3.98)
WBC # BLD AUTO: 4.3 X10*3/UL (ref 4.4–11.3)

## 2024-12-24 PROCEDURE — 85027 COMPLETE CBC AUTOMATED: CPT

## 2024-12-24 PROCEDURE — 84403 ASSAY OF TOTAL TESTOSTERONE: CPT

## 2024-12-24 PROCEDURE — 84443 ASSAY THYROID STIM HORMONE: CPT

## 2024-12-24 PROCEDURE — 80053 COMPREHEN METABOLIC PANEL: CPT

## 2025-01-06 ENCOUNTER — APPOINTMENT (OUTPATIENT)
Dept: UROLOGY | Facility: CLINIC | Age: 30
End: 2025-01-06
Payer: COMMERCIAL

## 2025-01-06 ENCOUNTER — TELEMEDICINE (OUTPATIENT)
Dept: PRIMARY CARE | Facility: CLINIC | Age: 30
End: 2025-01-06
Payer: COMMERCIAL

## 2025-01-06 NOTE — PROGRESS NOTES
Subjective   Patient ID: Roman Way is a 29 y.o. transgender male who presents for virtual visit for follow-up    HPI    GI upset  - See last visit for further information. At this time he was reporting gas, bloating and diarrhea. Our plan was to get labs (all reassuring) and trial elimination diet  - He is doing much better now that he has stopped dairy    History of stroke  - Has resumed aspirin since last visit    Gender affirming care  - Resumed testosterone 3 weeks ago, tolerating well  - Working with Dr. Oconnor on scheduling hysterectomy. Has a letter of support     Anxiety  - Last visit we increased lexapro  - Anxiety slightly improved   - Additionally he has upcoming appointment with therapist at [facility]  - Complains of lack of sex drive, which he is working on in couples therapy. This has been present for 2 years.     10 point review of systems negative except as noted in HPI.      Objective     There were no vitals taken for this visit.  General: well appearing, no distress  Neck: No lymphadenopathy, no thyromegaly  CV: Regular rate and rhythm, no murmur  Lungs: Clear to auscultation bilaterally  Abdomen: Soft, nontender, nondistended  Extremities: No edema noted  Psych: Appropriate mood and affect        Assessment/Plan     Rmoan Way is a 28 y.o. transgender male who presents for follow-up    #Anxiety and Depression  -Continue lexapro 20 mg  - Encouraged counseling    #Decreased sex drive  - May in part be related to SSRI, and/or poorly controlled anxiety  - Hesitant to begin wellbutrin at this point but may be worth considering down the line  - Will check testosterone levels as well, as this may assist in sex drive     #GI upset  -Improving, monitor    #Gender affirming care  -Follow-up with Dr. Oconnor concerning hysterectomy  - Continue testosterone, with labs in a month    #History of stroke  -Continue aspirin     RTC 1 month via virtual visit, labs  prior

## 2025-01-16 ENCOUNTER — APPOINTMENT (OUTPATIENT)
Dept: UROLOGY | Facility: CLINIC | Age: 30
End: 2025-01-16
Payer: COMMERCIAL

## 2025-01-17 ENCOUNTER — TELEMEDICINE (OUTPATIENT)
Dept: PRIMARY CARE | Facility: CLINIC | Age: 30
End: 2025-01-17
Payer: COMMERCIAL

## 2025-01-17 DIAGNOSIS — G25.81 RESTLESS LEGS: ICD-10-CM

## 2025-01-17 DIAGNOSIS — F64.9 GENDER DYSPHORIA: Primary | ICD-10-CM

## 2025-01-17 DIAGNOSIS — F32.A ANXIETY AND DEPRESSION: ICD-10-CM

## 2025-01-17 DIAGNOSIS — F41.9 ANXIETY AND DEPRESSION: ICD-10-CM

## 2025-01-17 PROCEDURE — 99214 OFFICE O/P EST MOD 30 MIN: CPT | Performed by: FAMILY MEDICINE

## 2025-01-17 NOTE — PROGRESS NOTES
Subjective   Patient ID: Roman Way is a 29 y.o. transgender male who presents via virtual visit for follow-up    HPI    2 days ago felt dizzy and lightheaded- self-resolved quickly     History of stroke- Has resumed aspirin since last visit    Gender affirming care  - Testosterone 0.2 ml weekly (decreased 3 weeks ago). Overall happy with effects. Was having weight gain, now back to normal  - Planning gender affirming hysterectomy- had to cancel due to work.     Restless legs  - Has happened occasionally. Would like iron checked with next labs. No anemia on last labs     Anxiety  - Lexapro is helping. At times forgets to take it  - No therapy, due to insurance issues     10 point review of systems negative except as noted in HPI.      Objective     There were no vitals taken for this visit.  Gen: Well appearing, no acute distress  HEENT: Mucous membranes moist  Pulm: Respirations nonlabored  Psych: Normal mood and affect          Assessment/Plan     Roman Way is a 28 y.o. transgender male who presents for follow-up    #Anxiety and Depression  -Continue lexapro 20 mg    #Gender affirming care  -Continue current testosterone dosage, repeat labs in 1 month    #History of stroke  -Continue aspirin     Labs in 1 month  RTC 2-3 months

## 2025-01-20 VITALS — BODY MASS INDEX: 25.73 KG/M2 | WEIGHT: 149.91 LBS

## 2025-02-05 DIAGNOSIS — F64.9 GENDER DYSPHORIA: ICD-10-CM

## 2025-02-05 RX ORDER — TESTOSTERONE CYPIONATE 200 MG/ML
40 INJECTION, SOLUTION INTRAMUSCULAR
Qty: 4 ML | Refills: 1 | Status: SHIPPED | OUTPATIENT
Start: 2025-02-09

## 2025-03-07 ENCOUNTER — APPOINTMENT (OUTPATIENT)
Dept: UROLOGY | Facility: CLINIC | Age: 30
End: 2025-03-07
Payer: COMMERCIAL

## 2025-04-01 ENCOUNTER — APPOINTMENT (OUTPATIENT)
Dept: ORTHOPEDIC SURGERY | Facility: CLINIC | Age: 30
End: 2025-04-01
Payer: COMMERCIAL

## 2025-04-01 DIAGNOSIS — S93.402A SPRAIN OF LEFT ANKLE, UNSPECIFIED LIGAMENT, INITIAL ENCOUNTER: Primary | ICD-10-CM

## 2025-04-01 PROCEDURE — 99203 OFFICE O/P NEW LOW 30 MIN: CPT | Performed by: PEDIATRICS

## 2025-04-01 NOTE — PATIENT INSTRUCTIONS
ANKLE INJURY:  Ankle inversion is a common sports injury that can sideline you for longer than necessary if not treated appropriately. Ligaments that stabilize your joint get stretched. Any activity that causes pain is straining those ligaments further and preventing them from healing well. Ankle instability, a condition characterized by a recurring giving way of the outer (lateral) side of the ankle, is unnecessary complication. Instability develops after repeated ankle sprains, but may occur after the first time if not treated properly. Usually, the giving way occurs while walking or doing other activities, but it can also happen when you're just standing.     Sprain in ligament   Diagnosis:  Clinical history and exam often provide the diagnosis.   An X-ray is often done to evaluate for other causes of pain.   Occasionally, an MRI will help confirm diagnosis if not improving with treatment.    Treatment:  Modify activity to avoid pain  Ice 15-20 minutes several times initially and then, as needed for pain/inflammation and after activity  Support in a boot, cast, or brace will support your ankle while healing.   Wear boot until pain free at rest and with motion.   Continue in boot until pain free at rest, pain free with range of motion, and able to walk without pain. Then, gradually increase time out of boot as pain allows. If your pain increases, you are likely weaning out of boot too quickly.   As you transition out of boot, you may feel more comfortable using an ankle brace for support. Braces do not prevent re-injury (only strengthening does) but may help with pain control.   Strengthening is the most important part of treatment and prevention of recurrent injury.     Return to activity guidelines  Once out of boot, gradually increase activity as pain allows.   Must meet each goal before return to play:  Able to walk all day without pain   Able to run, sprint, and jump without pain  Able to cut/ change  direction without pain  Participate in light drills/non-contact practice prior to game play  Participate in full practice prior to game play    DIagnosis, evaluation, and treatment options were explained to patient in detail and questions answered.   A detailed handout was provided to patient with further information on diagnosis, evaluation, and treatment.   Home exercises were explained and included on the sheet.  Further treatment as discussed.    FOLLOW UP: 3 weeks   Call Pediatric Sports Medicine Office @ [phone] to schedule, if not improving as expected , or for any further concerns.    Air Walker Boot Instructions:        Apply a long sock on the foot needing the boot.  Loosen all Velcro straps and open foam liner.  Sit with knee bent to 90º and slide foot into the foam liner. Make sure heel is all the way back and foot is fully seated in the liner.  Close liner snugly around shin and foot.  Beginning with the foot, secure the Velcro straps followed by the Velcro straps on the shin. Make sure to pull all the Velcro straps snug.  Your boot has air compartments that need to be inflated to further secure your foot. Check to see how many to make sure to inflate them all. If there is more than one, they are often numbered with a dial to inflate each one.  Once boot is snugly on, start inflating compartments 1 at a time until feel slight pressure.   Once snug, turn dial to lock. (Note: not all boots have a dial/lock)  Deflate all air compartments and reset boot daily to make sure it is well fit. If your foot is sliding around, the boot needs to be reset and likely more air is needed.     Ankle strengthening:  Avoid painful motion: start isometric (ie resist without movement) and add motion as able  4 way ankle with theraband (use other foot for isometric exercises)- 3 sets of 10     Balance:   Single leg balance: hold for as long as able. Close eyes if can hold for 20 seconds. Repeat x 3. Do on each side.  Y  balance exercise- use targets to create Y, balance on one foot and slide other foot toward targets. Challenge: close eyes, inc speed, add weight   Heel raises on step-- slowly raise & lower on two feet --> advance to single foot raise  Lateral single leg hops- land and stick on single leg

## 2025-04-01 NOTE — PROGRESS NOTES
Consulting physician: oNrma Lawson MD  A report with my findings and recommendations will be sent to the primary and referring physician via written or electronic means when information is available    History of Present Illness:  Roman Way is a 29 y.o. transgender male here with acute left ankle pain and inversion injury 3/25/25.  He thinks he stepped funny or in a hole inverting his ankle while walking outside for work. He felt like his ankle snapped.     Went to [facility] ER and told he had a moderate to severe sprain.  Has been wearing aircast and ace bandage since.    Noticed over the last week his foot has swelled and he feels like his foot goes numb when he puts weight on it.     Worse with: WB activity-- lateral pain and tingling on bottom lateral forefoot  Better with: warm soak    Prior Treatment:   Prior Evaluation by Physician: Yes    Date: [facility] 3/26/25  Physical Therapy: No  Medications: No  Modified activities/sports  Yes - has not returned to work    One prior ankle sprain years ago when playing sports, but no lingering issues.     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 No  Pes planus: None  Pes cavus: None  SL Proprioception: good  Single leg toe raises: minimal pronation, no pain  SL squats: valgus knee? mod  SL squats: Trendelenburg? mod  Hop test: no pain, lands toes to heel  Hop test: no loss of jump height    Range of motion:  Inversion (20-35)   Eversion (5-25)  Dorsiflexion (~20)    Plantarflexion (40-50)    Full? Yes  Pain? No    Strength:  Dorsiflexion 5/5  Plantarflexion  5/5  Inversion 5/5  Eversion  5/5  Pain? No    Ligament Tests:  Anterior drawer  (ATFL): negative  Talar tilt (inversion/ ATFL&CFL ligaments): inc inversion and painful  Deltoid/ eversion tilt: negative  Foot DF/ER test (syndesmosis): negative  Tibia-fibula squeeze test (syndesmosis): negative    Palpation:  TTP Tibia No  TTP Fibula (inc proximal) No  TTP Medial malleolus No  TTP Lateral malleolus No    TTP ATFL YES  TTP CFL YES  TTP Deltoid ligament No  TTP Syndesmosis MILD  TTP Anterior joint line No  TTP Talus No  TTP Sinus tarsi No  TTP Calcaneus No    TTP Lis franc joint No  TTP Base of the fifth metatarsal No  TTP Navicular No  TTP Cuboid No  TTP Cuneiforms No  TTP Metatarsals No    TTP Achilles No  TTP Plantar fascia No  TTP Peroneal tendon No  TTP Posterior tibialis No  TTP Anterior tibialis No    Imaging: Outside facility radiology images, brought by the patient's family, were independently viewed and interpreted in the presence of the patient's family.    Impression:  Roman Way is a 29 y.o. adult with   1. Sprain of left ankle, unspecified ligament, initial encounter        Plan:  Orders Placed This Encounter   Procedures    Walking Boot Tall   HEP   Return to work as pain allows-- wean out of boot into ankle brace  FOLLOW UP:  if not improved in 3 weeks   DIagnosis, evaluation, and treatment options were explained to patient in detail and questions answered.   See Patient Instructions for more details of what was provided to patient with further information on diagnosis, evaluation, and treatment.

## 2025-04-01 NOTE — PROGRESS NOTES
"Consulting physician: Norma Lawson MD  A report with my findings and recommendations will be sent to the primary and referring physician via written or electronic means when information is available    History of Present Illness:  Roman Way is a 29 y.o. transgender male here with ***     Worse with: ***  Better with: ***    Prior Treatment:   Prior Evaluation by Physician: {YES/DATE/NO:17979::\"No\"}  Physical Therapy: {YES/DATE/NO:03171::\"No\"}  Medications: {YES wildcard/NO:60::\"No\"}  Modified activities/sports  {YES wildcard/NO:60::\"No\"}    Social Hx:  School/ Grade:  ***  Sports: ***    Physical Exam:  General appearance: Well-appearing well-nourished  Psych: Normal mood and affect  ***    Imaging: {XR FINDINGS:05694::\"Radiology images of the area of concern were ordered and independently viewed and interpreted in the presence of the patient's family.\"}    Impression:  Roman Way is a 29 y.o. adult with No diagnosis found.    Plan:  No orders of the defined types were placed in this encounter.      FOLLOW UP:  ***   DIagnosis, evaluation, and treatment options were explained to patient in detail and questions answered.   See Patient Instructions for more details of what was provided to patient with further information on diagnosis, evaluation, and treatment.     "

## 2025-04-05 ENCOUNTER — HOSPITAL ENCOUNTER (OUTPATIENT)
Dept: RADIOLOGY | Facility: EXTERNAL LOCATION | Age: 30
Discharge: HOME | End: 2025-04-05
Payer: COMMERCIAL

## 2025-04-24 ENCOUNTER — APPOINTMENT (OUTPATIENT)
Dept: PODIATRY | Facility: CLINIC | Age: 30
End: 2025-04-24
Payer: COMMERCIAL

## 2025-08-15 LAB
ERYTHROCYTE [DISTWIDTH] IN BLOOD BY AUTOMATED COUNT: 11.9 % (ref 11–15)
FERRITIN SERPL-MCNC: 75 NG/ML (ref 38–380)
HCT VFR BLD AUTO: 46.2 % (ref 38.5–50)
HGB BLD-MCNC: 15.3 G/DL (ref 13.2–17.1)
IRON SATN MFR SERPL: 39 % (CALC) (ref 20–48)
IRON SERPL-MCNC: 117 MCG/DL (ref 50–195)
MCH RBC QN AUTO: 30.4 PG (ref 27–33)
MCHC RBC AUTO-ENTMCNC: 33.1 G/DL (ref 32–36)
MCV RBC AUTO: 91.8 FL (ref 80–100)
PLATELET # BLD AUTO: 318 THOUSAND/UL (ref 140–400)
PMV BLD REES-ECKER: 9.8 FL (ref 7.5–12.5)
RBC # BLD AUTO: 5.03 MILLION/UL (ref 4.2–5.8)
TESTOST SERPL-MCNC: 825 NG/DL (ref 250–827)
TIBC SERPL-MCNC: 302 MCG/DL (CALC) (ref 250–425)
WBC # BLD AUTO: 5.5 THOUSAND/UL (ref 3.8–10.8)